# Patient Record
Sex: FEMALE
[De-identification: names, ages, dates, MRNs, and addresses within clinical notes are randomized per-mention and may not be internally consistent; named-entity substitution may affect disease eponyms.]

---

## 2017-01-04 ENCOUNTER — HOSPITAL ENCOUNTER (EMERGENCY)
Dept: HOSPITAL 25 - UCEAST | Age: 57
Discharge: HOME | End: 2017-01-04
Payer: COMMERCIAL

## 2017-01-04 VITALS — SYSTOLIC BLOOD PRESSURE: 109 MMHG | DIASTOLIC BLOOD PRESSURE: 65 MMHG

## 2017-01-04 DIAGNOSIS — H92.03: ICD-10-CM

## 2017-01-04 DIAGNOSIS — J02.9: ICD-10-CM

## 2017-01-04 DIAGNOSIS — J32.9: Primary | ICD-10-CM

## 2017-01-04 PROCEDURE — G0463 HOSPITAL OUTPT CLINIC VISIT: HCPCS

## 2017-01-04 PROCEDURE — 99212 OFFICE O/P EST SF 10 MIN: CPT

## 2017-01-04 NOTE — UC
Throat Pain/Nasal Michael HPI





- HPI Summary


HPI Summary: 





complaint of nasal congestion and cough that started 3 weeks ago


sore throat and bilateral ear pain for 2 days


intermittent headaches, sinus pressure in her face


productive cough


occasionally feels chills and feels fatigued


took some ibuprofen sudafed mucinex with minimal relief


 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











- History of Current Complaint


Chief Complaint: UCRespiratory


Stated Complaint: SORE THROAT


Time Seen by Provider: 01/04/17 13:12


Hx Obtained From: Patient


Hx Last Menstrual Period: "a couple years ago."





- Allergies/Home Medications


Allergies/Adverse Reactions: 


 Allergies











Allergy/AdvReac Type Severity Reaction Status Date / Time


 


No Known Allergies Allergy   Verified 01/04/17 13:01














PMH/Surg Hx/FS Hx/Imm Hx


Previously Healthy: Yes


Endocrine History Of: 


   Denies: Diabetes, Thyroid Disease, Hyperthyroidism, Hypothyroidism, 

Dyslipidemia


Cardiovascular History Of: 


   Denies: Cardiac Disorders, Hypertension, Pacemaker/ICD, Myocardial Infarction

, Congestive Heart Failure, Atrial Fibrillation, Deep Vein Thrombosis, Bleeding 

Disorders


Respiratory History Of: 


   Denies: COPD, Asthma, Bronchitis, Pneumonia, Pulmonary Embolism


GI/ History Of: 


   Denies: Gastroesophageal Reflux, Ulcer, Gastrointestinal Bleed, Gall Bladder 

Disease, Kidney Stones, Diverticulitis, Renal Disease, Urosepsis


Neurological History Of: 


   Denies: TIA, CVA, Dementia, Seizures, Migraine


Psychological History Of: Reports: Depression


   Denies: Anxiety, Bipolar Disorder, Schizophrenia, Post Traumatic Stress 

Disorder


Cancer History Of: 


   Denies: Lung Cancer, Colorectal Cancer, Breast Cancer, Prostate Cancer, 

Cervical Cancer


Other History Of: 


   Negative For: HIV, Hepatitis B, Hepatitis C, Anticoagulant Therapy





- Surgical History


Surgical History: Yes


Surgery Procedure, Year, and Place: C SECTION





- Family History


Known Family History: Positive: Cardiac Disease


   Negative: Hypertension, Diabetes





- Social History


Occupation: Employed Full-time


Lives: With Family


Alcohol Use: Occasionally


Substance Use Type: None, Marijuana


Substance Use Comment - Amount & Last Used: occasional


Smoking Status (MU): Never Smoked Tobacco





Review of Systems


Constitutional: Chills, Fatigue


Skin: Negative


Eyes: Negative


ENT: Sore Throat, Ear Ache, Nasal Discharge


Respiratory: Cough


Cardiovascular: Negative


Gastrointestinal: Negative


Genitourinary: Negative


Motor: Negative


Neurovascular: Negative


Musculoskeletal: Negative


Neurological: Negative


Psychological: Negative


All Other Systems Reviewed And Are Negative: Yes





Physical Exam


Triage Information Reviewed: Yes


Appearance: No Pain Distress, Well-Nourished


Vital Signs: 


 Initial Vital Signs











Temp  97.6 F   01/04/17 12:57


 


Pulse  77   01/04/17 12:57


 


Resp  20   01/04/17 12:57


 


BP  109/65   01/04/17 12:57


 


Pulse Ox  100   01/04/17 12:57











Vital Signs Reviewed: Yes


Eyes: Positive: Conjunctiva Clear


ENT: Positive: Pharyngeal erythema, Nasal congestion, Nasal drainage, TM bulging

, Other: - frontal and maxillary sinus tenderness.  Negative: TM red


Neck: Positive: No Lymphadenopathy


Respiratory: Positive: Lungs clear, Normal breath sounds, No respiratory 

distress


Cardiovascular: Positive: RRR, No Murmur, Pulses Normal


Abdomen Description: Positive: Nontender, Soft


Bowel Sounds: Positive: Present


Musculoskeletal: Positive: No Edema


Neurological: Positive: Alert


Psychological Exam: Normal


Skin Exam: Normal





Throat Pain/Nasal Course/Dx





- Course


Course Of Treatment: exam completed





- Differential Dx/Diagnosis


Differential Diagnosis/HQI/PQRI: Pharyngitis, Sinusitis, URI


Provider Diagnoses: sinusitis





Discharge





- Discharge Plan


Condition: Stable


Disposition: HOME


Prescriptions: 


Amoxicillin/Clavulanate TAB* [Augmentin *] 875 mg PO BID #20 tab


Patient Education Materials:  Sinusitis (ED)


Forms:  *Work Release


Additional Instructions: 


Please take antibiotic as directed.


 Increase fluids and rest


Take acetaminophen or ibuprofen for fever or pain


Please review your discharge instructions.


 If your symptoms do not improve please call your primary care provider or 

return to urgent care.


SINUSITIS 


What is Sinusitis? 


Sinusitis is inflammation or infection of the lining of the sinuses behind the 

bones in your cheeks or forehead. Sinusitis may occur following a common cold, 

flu, or other infection; allergies; a tooth infection that spreads to the 

sinuses; swimming in contaminated water; pressure changes in airplanes at high 

altitudes; violent sneezing or nose blowing or smoking or breathing other 

peoples smoke. 


Symptoms Might Include: 


 Nasal Congestion 


 Sneezing 


 Watery eyes, eye irritation, or eye itching  Headaches 


 Pressure in the cheeks 


 Wheezing 


 Trouble smelling 


 Sore throat and coughing may occur 





Treatment Recommendations: 


 Take medicines as prescribed until completely gone. 


 Drink plenty of fluids. 


 Use saline nose spray to thin the mucous and help the sinuses drain. 


 Use a vaporizer or humidifier. 


 Apply warm compresses to the face or forehead several times a day for 10 to 20 

minutes. 





Call Your Doctor or Return Here IF: 


 Your pain increases during treatment. 


 You develop a high temperature. 


 You develop unusual swelling around the eyes. 


 You have difficulty with your vision. 


 You develop a severe headache, earache, or toothache. 


 You develop increased fever or fever that does not respond to medication such 

as Tylenol?. 


 You have difficulty breathing or catching your breath. 


 You begin to have any other new symptoms that worry you.

## 2018-11-04 ENCOUNTER — HOSPITAL ENCOUNTER (EMERGENCY)
Dept: HOSPITAL 25 - UCEAST | Age: 58
Discharge: HOME | End: 2018-11-04
Payer: COMMERCIAL

## 2018-11-04 VITALS — SYSTOLIC BLOOD PRESSURE: 130 MMHG | DIASTOLIC BLOOD PRESSURE: 66 MMHG

## 2018-11-04 DIAGNOSIS — L03.211: Primary | ICD-10-CM

## 2018-11-04 PROCEDURE — G0463 HOSPITAL OUTPT CLINIC VISIT: HCPCS

## 2018-11-04 PROCEDURE — 99212 OFFICE O/P EST SF 10 MIN: CPT

## 2018-11-04 NOTE — UC
Skin Complaint HPI





- HPI Summary


HPI Summary: 





57 yo female presents with rash to forehead/hairline. She tells me that 4 days 

ago she wore a witch's hat for Halloween. The next day she noticed some redness 

to this area of her head. Since that time she has had increased redness, 

swelling, mild pain, and itchiness to the area. She has been applying calamine 

lotion with no relief. Denies fever or chills.





- History of Current Complaint


Chief Complaint: UCRash


Time Seen by Provider: 11/04/18 11:33


Stated Complaint: RASH


Hx Obtained From: Patient


Hx Last Menstrual Period: "a couple years ago."


Onset/Duration: Sudden Onset


Onset Severity: Mild


Current Severity: Mild


Pain Intensity: 3


Pain Scale Used: 0-10 Numeric





- Allergy/Home Medications


Allergies/Adverse Reactions: 


 Allergies











Allergy/AdvReac Type Severity Reaction Status Date / Time


 


No Known Allergies Allergy   Verified 11/04/18 11:31











Home Medications: 


 Home Medications





Estradiol VAGINAL TAB(NF) [Vagifem(NF)] 10 mcg VA 11/04/18 [History]











Review of Systems


Constitutional: Negative


Skin: Other - Rash forehead


Eyes: Negative


ENT: Negative


Respiratory: Negative


Cardiovascular: Negative


Gastrointestinal: Negative


Neurological: Negative


Psychological: Negative


All Other Systems Reviewed And Are Negative: Yes





PMH/Surg Hx/FS Hx/Imm Hx


Psychological History: Depression


Other History Of: 


   Negative For: HIV, Hepatitis B, Hepatitis C, Anticoagulant Therapy





- Surgical History


Surgical History: Yes


Surgery Procedure, Year, and Place: C SECTION





- Family History


Known Family History: Positive: Cardiac Disease


   Negative: Hypertension, Diabetes





- Social History


Occupation: Employed Full-time


Lives: With Family


Alcohol Use: Weekly


Substance Use Type: Marijuana


Substance Use Comment - Amount & Last Used: occasional


Smoking Status (MU): Never Smoked Tobacco





Physical Exam





- Summary


Physical Exam Summary: 





GENERAL: NAD. WDWN. No pain distress.


SKIN: At frontal forehead/hairline there is mild erythema, edema, and scant 1mm 

vesicles. Mild TTP. No warmth, streaking, drainage, or abscess appreciated.


NECK: Supple. Nontender. No lymphadenopathy. 


CHEST:  No accessory muscle use. Breathing comfortably and in no distress.


CV:  Pulses intact. Cap refill <2seconds


NEURO: Alert.


PSYCH: Age appropriate behavior.





Triage Information Reviewed: Yes


Vital Signs: 


 Initial Vital Signs











Temp  98.6 F   11/04/18 11:27


 


Pulse  83   11/04/18 11:27


 


Resp  18   11/04/18 11:27


 


BP  130/66   11/04/18 11:27


 


Pulse Ox  99   11/04/18 11:27











Vital Signs Reviewed: Yes





Course/Dx





- Course


Course Of Treatment: Pt is concerned that this may be shingles. I suspect this 

started as a contact dermatitis due to the witch's hat, but has developed into 

a cellulitis. 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 

therefore I will rx for acyclovir ointment to use to the area in addition to 

antibiotics for cellulitis.





- Diagnoses


Provider Diagnoses: Cellulitis to forehead





Discharge





- Sign-Out/Discharge


Documenting (check all that apply): Patient Departure


All imaging exams completed and their final reports reviewed: No Studies





- Discharge Plan


Condition: Stable


Disposition: HOME


Prescriptions: 


Acyclovir [Acyclovir 5% TOPICAL] 5 % TOPICAL BID #1 tube


Cephalexin CAP* [Keflex CAP*] 500 mg PO BID #14 cap


Patient Education Materials:  Contact Dermatitis (ED), Cellulitis (ED)


Forms:  *Work Release


Referrals: 


Coco Colvin MD [Primary Care Provider] - 


Additional Instructions: 


If you develop a fever, shortness of breath, chest pain, new or worsening 

symptoms - please call your PCP or go to the ED.


 








- Billing Disposition and Condition


Condition: STABLE


Disposition: Home